# Patient Record
Sex: FEMALE
[De-identification: names, ages, dates, MRNs, and addresses within clinical notes are randomized per-mention and may not be internally consistent; named-entity substitution may affect disease eponyms.]

---

## 2022-06-02 ENCOUNTER — HOSPITAL ENCOUNTER (EMERGENCY)
Dept: HOSPITAL 97 - ER | Age: 57
Discharge: HOME | End: 2022-06-02
Payer: COMMERCIAL

## 2022-06-02 VITALS — DIASTOLIC BLOOD PRESSURE: 74 MMHG | SYSTOLIC BLOOD PRESSURE: 142 MMHG

## 2022-06-02 VITALS — TEMPERATURE: 99.2 F

## 2022-06-02 VITALS — OXYGEN SATURATION: 99 %

## 2022-06-02 DIAGNOSIS — J06.9: Primary | ICD-10-CM

## 2022-06-02 DIAGNOSIS — Z20.822: ICD-10-CM

## 2022-06-02 LAB
ALBUMIN SERPL BCP-MCNC: 3.7 G/DL (ref 3.4–5)
ALP SERPL-CCNC: 115 U/L (ref 45–117)
ALT SERPL W P-5'-P-CCNC: 55 U/L (ref 12–78)
AST SERPL W P-5'-P-CCNC: 29 U/L (ref 15–37)
BUN BLD-MCNC: 10 MG/DL (ref 7–18)
GLUCOSE SERPLBLD-MCNC: 130 MG/DL (ref 74–106)
HCT VFR BLD CALC: 41.6 % (ref 36–45)
INR BLD: 0.96
LIPASE SERPL-CCNC: 138 U/L (ref 73–393)
LYMPHOCYTES # SPEC AUTO: 1.5 K/UL (ref 0.7–4.9)
MAGNESIUM SERPL-MCNC: 2.3 MG/DL (ref 1.8–2.4)
NT-PROBNP SERPL-MCNC: 56 PG/ML (ref ?–125)
PMV BLD: 8.9 FL (ref 7.6–11.3)
POTASSIUM SERPL-SCNC: 3.7 MMOL/L (ref 3.5–5.1)
RBC # BLD: 4.95 M/UL (ref 3.86–4.86)

## 2022-06-02 PROCEDURE — 83735 ASSAY OF MAGNESIUM: CPT

## 2022-06-02 PROCEDURE — 99285 EMERGENCY DEPT VISIT HI MDM: CPT

## 2022-06-02 PROCEDURE — 80048 BASIC METABOLIC PNL TOTAL CA: CPT

## 2022-06-02 PROCEDURE — 93005 ELECTROCARDIOGRAM TRACING: CPT

## 2022-06-02 PROCEDURE — 80076 HEPATIC FUNCTION PANEL: CPT

## 2022-06-02 PROCEDURE — 83880 ASSAY OF NATRIURETIC PEPTIDE: CPT

## 2022-06-02 PROCEDURE — 71045 X-RAY EXAM CHEST 1 VIEW: CPT

## 2022-06-02 PROCEDURE — 36415 COLL VENOUS BLD VENIPUNCTURE: CPT

## 2022-06-02 PROCEDURE — 85610 PROTHROMBIN TIME: CPT

## 2022-06-02 PROCEDURE — 85379 FIBRIN DEGRADATION QUANT: CPT

## 2022-06-02 PROCEDURE — 85730 THROMBOPLASTIN TIME PARTIAL: CPT

## 2022-06-02 PROCEDURE — 71275 CT ANGIOGRAPHY CHEST: CPT

## 2022-06-02 PROCEDURE — 85025 COMPLETE CBC W/AUTO DIFF WBC: CPT

## 2022-06-02 PROCEDURE — 83690 ASSAY OF LIPASE: CPT

## 2022-06-02 NOTE — RAD REPORT
EXAM DESCRIPTION:  [place] Single View6/2/2022 1:00 pm

 

CLINICAL HISTORY:  sob

 

COMPARISON:  none

 

FINDINGS:   The lungs appear clear of acute infiltrate. The heart is normal size

 

IMPRESSION:   No acute abnormalities displayed

## 2022-06-02 NOTE — EDPHYS
Physician Documentation                                                                           

 [facility]                                                                 

Name: Sylvia Hopkins                                                                            

Age: 56 yrs                                                                                       

Sex: Female                                                                                       

: 1965                                                                                   

MRN: [id number]                                                                                   

Arrival Date: 2022                                                                          

Time: 12:09                                                                                       

Account#: [id number]                                                                            

Bed 15                                                                                            

Private MD:                                                                                       

[employer] Physician Javier Valadez                                                                      

HPI:                                                                                              

                                                                                             

12:20 This 56 yrs old  Female presents to ER via Ambulatory with complaints of        jh7 

      Shortness Of Breath.                                                                        

12:20 Onset: The symptoms/episode began/occurred this morning. Associated signs and symptoms: jh7 

      Pertinent positives: non-productive cough, Pertinent negatives: chest pain, dizziness,      

      fever, loss of consciousness, nausea, vomiting. Patient states that she was in a            

      meeting and suddenly became short of breath. States that she has a history of bad           

      allergies and has had cough and congestion since this morning. She reports chest            

      tightness, but denies any chest pain..                                                      

                                                                                                  

Historical:                                                                                       

- Allergies:                                                                                      

12:18 No Known Allergies;                                                                     tw2 

- Home Meds:                                                                                      

12:18 None [Active];                                                                          tw2 

- PMHx:                                                                                           

12:18 None;                                                                                   tw2 

- PSHx:                                                                                           

12:18 left hand surgery;                                                                      tw2 

                                                                                                  

- Immunization history:: Client reports receiving the 2nd dose of the Covid vaccine.              

- Social history:: Smoking status: Patient denies any tobacco usage or history of.                

                                                                                                  

                                                                                                  

ROS:                                                                                              

12:20 Constitutional: Negative for fever, chills, and weight loss, ENT: Negative for injury,  jh7 

      pain, and discharge, Cardiovascular: Negative for chest pain, palpitations, and edema,      

      Abdomen/GI: Negative for abdominal pain, nausea, vomiting, diarrhea, and constipation,      

      Back: Negative for injury and pain, Skin: Negative for injury, rash, and discoloration,     

      Neuro: Negative for headache, weakness, numbness, tingling, and seizure.                    

12:20 Respiratory: Positive for cough, shortness of breath, Negative for hemoptysis,              

      orthopnea, wheezing.                                                                        

12:20 All other systems are negative.                                                             

                                                                                                  

Exam:                                                                                             

12:20 Constitutional:  This is a well developed, well nourished patient who is awake, alert,  jh7 

      and in no acute distress. Cardiovascular:  Regular rate and rhythm with a normal S1 and     

      S2.  No gallops, murmurs, or rubs.  Normal PMI, no JVD.  No pulse deficits.                 

12:20 Abdomen/GI:  Soft, non-tender, with normal bowel sounds.  No distension or tympany.  No     

      guarding or rebound.  No evidence of tenderness throughout. Back:  No spinal                

      tenderness.  No costovertebral tenderness.  Full range of motion. Skin:  Warm, dry with     

      normal turgor.  Normal color with no rashes, no lesions, and no evidence of cellulitis.     

      Neuro:  Awake and alert, GCS 15, oriented to person, place, time, and situation.            

      Motor strength 5/5 in all extremities.  Sensory grossly intact. Normal gait.                

12:20 ENT: TM's: are normal, Nose: nasal drainage, and is seen coming from both nares, that       

      is clear, Posterior pharynx: post nasal drainage.                                           

12:20 Respiratory: the patient does not display signs of respiratory distress,  Respirations:     

      normal, Breath sounds: are clear throughout, Respiratory rate:  24 coughing on exam.        

                                                                                                  

Vital Signs:                                                                                      

12:16  / 69; Pulse 92; Resp 17; Temp 99.2(TE); Pulse Ox 100% on R/A; Weight 65.77 kg    tw2 

      (R);                                                                                        

12:35  / 69; Pulse 93; Resp 16 S; Pulse Ox 100% on R/A;                                 jg9 

13:30  / 68; Pulse 98; Resp 24 S; Pulse Ox 99% on R/A;                                  jg9 

14:30  / 74; Pulse 103; Resp 13 S; Pulse Ox 99% on R/A;                                 jg9 

                                                                                                  

MDM:                                                                                              

12:21 Patient medically screened.                                                             [facility] 

14:45 Differential diagnosis: asthma, pneumonia, pulmonary edema, Pulmonary Embolism. Data    [facility] 

      reviewed: vital signs, nurses notes, lab test result(s), EKG, radiologic studies, CT        

      scan, plain films. Data interpreted: Pulse oximetry: is 99 %. Interpretation: normal.       

      Counseling: I had a detailed discussion with the patient and/or guardian regarding: the     

      historical points, exam findings, and any diagnostic results supporting the                 

      discharge/admit diagnosis, to return to the emergency department if symptoms worsen or      

      persist or if there are any questions or concerns that arise at home. Special               

      discussion: I discussed with the patient/guardian in detail that at this point there is     

      no indication for admission to the hospital. It is understood, however, that if the         

      symptoms persist or worsen the patient needs to return immediately for re-evaluation.       

      ED course: Patient stable at time of discharge.                                             

                                                                                                  

                                                                                             

12:33 Order name: BMP; Complete Time: 13:58                                                   [facility] 

                                                                                             

12:33 Order name: CBC with Diff; Complete Time: 13:33                                         [facility] 

                                                                                             

12:33 Order name: D-Dimer; Complete Time: 13:33                                               [facility] 

02                                                                                             

12:33 Order name: Hepatic Function; Complete Time: 13:59                                      [facility] 

                                                                                             

12:33 Order name: Lipase; Complete Time: 13:59                                                [facility] 

                                                                                             

12:33 Order name: Magnesium; Complete Time: 13:59                                             [facility] 

                                                                                             

12:33 Order name: NT PRO-BNP; Complete Time: 13:59                                            [facility] 

                                                                                             

12:33 Order name: PT-INR; Complete Time: 13:33                                                [facility] 

                                                                                             

12:33 Order name: Ptt, Activated; Complete Time: 13:33                                        [facility] 

                                                                                             

12:33 Order name: XRAY CXR (1 view); Complete Time: 13:33                                     [facility] 

                                                                                             

12:33 Order name: EKG; Complete Time: 12:34                                                   [facility] 

                                                                                             

13:35 Order name: CT Chest For PE Angio; Complete Time: 14:33                                 [facility] 

                                                                                             

14:10 Order name: COVID-19 SARS RT PCR (Document "Date of Onset" if Symptomatic); Complete    aa5 

      Time: 16:30                                                                                 

                                                                                             

12:33 Order name: Cardiac monitoring; Complete Time: 13:05                                    [facility] 

                                                                                             

12:33 Order name: EKG - Nurse/Tech; Complete Time: 13:05                                      [facility] 

                                                                                             

12:33 Order name: IV Saline Lock; Complete Time: 13:05                                        [facility] 

                                                                                             

12:33 Order name: Labs collected and sent; Complete Time: 13:05                               [facility] 

                                                                                             

12:33 Order name: O2 Sat Monitoring; Complete Time: 13:05                                     [facility] 

                                                                                                  

EC:55 Rate is 87 beats/min. Rhythm is regular. QRS Axis is Normal. DC interval is normal. QRS jh7 

      interval is normal. QT interval is normal. Clinical impression: NSR w/ Non-specific         

      ST/T Changes.                                                                               

                                                                                                  

Administered Medications:                                                                         

13:05 Drug: Albuterol 2.5 mg Route: Inhalation;                                               jg9 

                                                                                                  

                                                                                                  

Disposition Summary:                                                                              

22 14:34                                                                                    

Discharge Ordered                                                                                 

      Location: Home                                                                          [facility] 

      Problem: new                                                                            jh7 

      Symptoms: have improved                                                                 [facility] 

      Condition: Stable                                                                       [facility] 

      Diagnosis                                                                                   

        - Acute upper respiratory infection, unspecified                                      [facility] 

        - Shortness of breath                                                                 [facility] 

      Followup:                                                                               [facility] 

        - With: Private Physician                                                                  

        - When: 2 - 3 days                                                                         

        - Reason: Recheck today's complaints                                                       

      Discharge Instructions:                                                                     

        - Discharge Summary Sheet                                                             [facility] 

        - Shortness of Breath, Adult                                                          [facility] 

        - Upper Respiratory Infection, Adult                                                  [facility] 

      Forms:                                                                                      

        - Medication Reconciliation Form                                                      [facility] 

        - Thank You Letter                                                                    [facility] 

        - Antibiotic Education                                                                [facility] 

        - Prescription Opioid Use                                                             [facility] 

      Prescriptions:                                                                              

        - ProAir HFA 90 mcg/actuation Inhalation HFA aerosol inhaler                               

            - inhale 2 puff by INHALATION route every 4-6 hours; 1 Inhaler; Refills: 0,       7 

      Product Selection Permitted                                                                 

        - brompheniramine-pseudoeph-DM 2-30-10 mg/5 mL Oral syrup                                  

            - take 10 milliliter by ORAL route every 4 hours; 240 milliliter; Refills: 0,     jh7 

      Product Selection Permitted                                                                 

Signatures:                                                                                       

Dispatcher [employer]                           Betzaida Oh RN                          RN   tw2                                                  

Jackeline Lan RN                   RN   jg9                                                  

Jackeline Collier, FNP                   [place] [address]                                                  

                                                                                                  

**************************************************************************************************

## 2022-06-02 NOTE — RAD REPORT
EXAM DESCRIPTION:  CT - Chest For Pe Angio - 6/2/2022 1:55 pm

 

CLINICAL HISTORY:  sob

 

COMPARISON:  None.

 

TECHNIQUE:  Dynamically enhanced axial 3 mm thick images of the chest were obtained during administra
tion of <100> mL Isovue 370 IV contrast. Coronal and oblique reconstruction images were generated and
 reviewed. Exam utilizes a protocol for optimal evaluation of pulmonary arterial tree.

 

Maximum intensity projections 3D imaging was utilized

 

All CT scans are performed using dose optimization technique as appropriate and may include automated
 exposure control or mA/KV adjustment according to patient size.

 

FINDINGS:  A pulmonary embolus is not seen.

 

A thoracic aortic aneurysm is not noted.

 

A pleural effusion is not seen. A pericardial effusion is not seen.

 

A lung consolidation is not present.

 

IMPRESSION:  Negative for a pulmonary embolism.

## 2022-06-02 NOTE — ER
Nurse's Notes                                                                                     

 [facility]                                                                 

Name: Sylvia Hopkins                                                                            

Age: 56 yrs                                                                                       

Sex: Female                                                                                       

: 1965                                                                                   

MRN: [id number]                                                                                   

Arrival Date: 2022                                                                          

Time: 12:09                                                                                       

Account#: [id number]                                                                            

Bed 15                                                                                            

Private MD:                                                                                       

Diagnosis: Acute upper respiratory infection, unspecified;Shortness of breath                     

                                                                                                  

Presentation:                                                                                     

                                                                                             

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 she said she started feeling dizzy and shaky and    tw2 

      now she says she couldn't breathe. +COUGHING. Chief complaint: also she said she feels      

      like her RIGHT arm is sleepy just today. Coronavirus screen: Client presents with at        

      least one sign or symptom that may indicate coronavirus-19. Standard/surgical mask          

      placed on the client. Provider contacted for isolation considerations. Coronavirus          

      screen: difficulty breathing. Ebola Screen: Patient denies travel to an Ebola-affected      

      area in the 21 days before illness onset. Initial Sepsis Screen: Does the patient meet      

      any 2 criteria? No. Patient's initial sepsis screen is negative. Does the patient have      

      a suspected source of infection? No. Patient's initial sepsis screen is negative. Risk      

      Assessment: Do you want to hurt yourself or someone else? Patient reports no desire to      

      harm self or others. Onset of symptoms was 2022.                                   

12:16 Method Of Arrival: Ambulatory                                                           tw2 

12:16 Acuity: DALY 3                                                                           tw2 

                                                                                                  

Triage Assessment:                                                                                

12:18 General: Appears in no apparent distress. slender, well groomed, Behavior is calm,      tw2 

      cooperative, appropriate for age. Pain: Denies pain. Neuro: Level of Consciousness is       

      awake, alert, obeys commands, Oriented to person, place, time, situation. Respiratory:      

      Reports shortness of breath at rest on exertion cough that is non-productive, Onset:        

      The symptoms/episode began/occurred this morning, the patient has mild shortness of         

      breath.                                                                                     

                                                                                                  

Historical:                                                                                       

- Allergies:                                                                                      

12:18 No Known Allergies;                                                                     tw2 

- Home Meds:                                                                                      

12:18 None [Active];                                                                          tw2 

- PMHx:                                                                                           

12:18 None;                                                                                   tw2 

- PSHx:                                                                                           

12:18 left hand surgery;                                                                      tw2 

                                                                                                  

- Immunization history:: Client reports receiving the 2nd dose of the Covid vaccine.              

- Social history:: Smoking status: Patient denies any tobacco usage or history of.                

                                                                                                  

                                                                                                  

Screenin:40 Abuse screen: Denies threats or abuse. Denies injuries from another. Nutritional        jg9 

      screening: No deficits noted. Tuberculosis screening: No symptoms or risk factors           

      identified. Fall Risk None identified.                                                      

                                                                                                  

Assessment:                                                                                       

12:39 Reassessment: No changes from previously documented assessment. Patient and/or family   jg9 

      updated on plan of care and expected duration. Pain level reassessed. Patient is alert,     

      oriented x 3, equal unlabored respirations, skin warm/dry/pink. Cardiovascular: No          

      deficits noted. Rhythm is sinus rhythm. Cardiovascular: Reports chest pain, shortness       

      of breath. Respiratory: Airway is patent Respiratory effort is even, unlabored, Breath      

      sounds are clear bilaterally.                                                               

14:30 Reassessment: Patient and/or family updated on plan of care and expected duration. Pain jg9 

      level reassessed. Patient is alert, oriented x 3, equal unlabored respirations, skin        

      warm/dry/pink. Patient states feeling better. Patient states symptoms have improved.        

                                                                                                  

Vital Signs:                                                                                      

12:16  / 69; Pulse 92; Resp 17; Temp 99.2(TE); Pulse Ox 100% on R/A; Weight 65.77 kg    tw2 

      (R);                                                                                        

12:35  / 69; Pulse 93; Resp 16 S; Pulse Ox 100% on R/A;                                 jg9 

13:30  / 68; Pulse 98; Resp 24 S; Pulse Ox 99% on R/A;                                  jg9 

14:30  / 74; Pulse 103; Resp 13 S; Pulse Ox 99% on R/A;                                 jg9 

                                                                                                  

ED Course:                                                                                        

12:09 Patient arrived in ED.                                                                  as  

12:18 Triage completed.                                                                       tw2 

12:19 Arm band placed on.                                                                     tw2 

12:21 [facility]                                                          jh7 

12:21 Javier Valadez MD is Attending Physician.                                             [facility] ER 

12:39 Jackeline Lan RN is Primary Nurse.                                                 j9 

12:40 No apparent distress. Resting quietly. Pt visited by Friend.                            j9 

12:40 Patient has correct armband on for positive identification. Bed in low position. Call   j9 

      light in reach. Side rails up X 1.                                                          

13:00 Inserted saline lock: 20 gauge in right antecubital area, using aseptic technique.      j9 

      Blood collected.                                                                            

13:02 XRAY CXR (1 view) In Process Unspecified.                                               EDMS

13:57 CT Chest For PE Angio In Process Unspecified.                                           EDMS

15:06 No provider procedures requiring assistance completed.                                  jg9 

15:07 IV discontinued.                                                                        jg9 

                                                                                                  

Administered Medications:                                                                         

13:05 Drug: Albuterol 2.5 mg Route: Inhalation;                                               jg9 

                                                                                                  

                                                                                                  

Medication:                                                                                       

12:40 VIS not applicable for this client.                                                     jg9 

                                                                                                  

Outcome:                                                                                          

14:34 Discharge ordered by MD.                                                                rg 

15:06 Discharged to home ambulatory.                                                          jg9 

15:06 Condition: improved                                                                         

15:06 Discharge instructions given to patient, Instructed on discharge instructions, follow       

      up and referral plans. Demonstrated understanding of instructions, follow-up care,          

      medications, Prescriptions given X 2.                                                       

15:07 Patient left the ED.                                                                    jg9 

                                                                                                  

Signatures:                                                                                       

Dispatcher [employer]                           Ivon Duque Tara, RN                          RN   tw2                                                  

Jackeline Lan, RN                   RN   jg9                                                  

Jackeline Collier, KELP                   FNP  7                                                  

                                                                                                  

**************************************************************************************************